# Patient Record
Sex: FEMALE | Race: OTHER | HISPANIC OR LATINO | URBAN - METROPOLITAN AREA
[De-identification: names, ages, dates, MRNs, and addresses within clinical notes are randomized per-mention and may not be internally consistent; named-entity substitution may affect disease eponyms.]

---

## 2024-09-25 ENCOUNTER — INPATIENT (INPATIENT)
Facility: HOSPITAL | Age: 34
LOS: 1 days | Discharge: HOME | DRG: 833 | End: 2024-09-27
Attending: OBSTETRICS & GYNECOLOGY | Admitting: OBSTETRICS & GYNECOLOGY
Payer: COMMERCIAL

## 2024-09-25 VITALS
RESPIRATION RATE: 16 BRPM | HEART RATE: 78 BPM | DIASTOLIC BLOOD PRESSURE: 66 MMHG | SYSTOLIC BLOOD PRESSURE: 114 MMHG | TEMPERATURE: 98 F

## 2024-09-25 DIAGNOSIS — O26.899 OTHER SPECIFIED PREGNANCY RELATED CONDITIONS, UNSPECIFIED TRIMESTER: ICD-10-CM

## 2024-09-25 LAB
BASOPHILS # BLD AUTO: 0.02 K/UL — SIGNIFICANT CHANGE UP (ref 0–0.2)
BASOPHILS NFR BLD AUTO: 0.2 % — SIGNIFICANT CHANGE UP (ref 0–2)
BLD GP AB SCN SERPL QL: SIGNIFICANT CHANGE UP
EOSINOPHIL # BLD AUTO: 0.04 K/UL — SIGNIFICANT CHANGE UP (ref 0–0.5)
EOSINOPHIL NFR BLD AUTO: 0.4 % — SIGNIFICANT CHANGE UP (ref 0–6)
HCT VFR BLD CALC: 36.3 % — SIGNIFICANT CHANGE UP (ref 34.5–45)
HGB BLD-MCNC: 11.9 G/DL — SIGNIFICANT CHANGE UP (ref 11.5–15.5)
IMM GRANULOCYTES NFR BLD AUTO: 0.5 % — SIGNIFICANT CHANGE UP (ref 0–0.9)
LYMPHOCYTES # BLD AUTO: 1.74 K/UL — SIGNIFICANT CHANGE UP (ref 1–3.3)
LYMPHOCYTES # BLD AUTO: 18.9 % — SIGNIFICANT CHANGE UP (ref 13–44)
MCHC RBC-ENTMCNC: 27.2 PG — SIGNIFICANT CHANGE UP (ref 27–34)
MCHC RBC-ENTMCNC: 32.8 GM/DL — SIGNIFICANT CHANGE UP (ref 32–36)
MCV RBC AUTO: 82.9 FL — SIGNIFICANT CHANGE UP (ref 80–100)
MONOCYTES # BLD AUTO: 0.71 K/UL — SIGNIFICANT CHANGE UP (ref 0–0.9)
MONOCYTES NFR BLD AUTO: 7.7 % — SIGNIFICANT CHANGE UP (ref 2–14)
NEUTROPHILS # BLD AUTO: 6.63 K/UL — SIGNIFICANT CHANGE UP (ref 1.8–7.4)
NEUTROPHILS NFR BLD AUTO: 72.3 % — SIGNIFICANT CHANGE UP (ref 43–77)
PLATELET # BLD AUTO: 174 K/UL — SIGNIFICANT CHANGE UP (ref 150–400)
RBC # BLD: 4.38 M/UL — SIGNIFICANT CHANGE UP (ref 3.8–5.2)
RBC # FLD: 14.1 % — SIGNIFICANT CHANGE UP (ref 10.3–14.5)
WBC # BLD: 9.19 K/UL — SIGNIFICANT CHANGE UP (ref 3.8–10.5)
WBC # FLD AUTO: 9.19 K/UL — SIGNIFICANT CHANGE UP (ref 3.8–10.5)

## 2024-09-25 RX ORDER — SODIUM CHLORIDE 0.9 % (FLUSH) 0.9 %
3 SYRINGE (ML) INJECTION EVERY 8 HOURS
Refills: 0 | Status: DISCONTINUED | OUTPATIENT
Start: 2024-09-25 | End: 2024-09-27

## 2024-09-25 RX ORDER — OXYTOCIN/RINGER'S LACTATE 20/500ML
PLASTIC BAG, INJECTION (ML) INTRAVENOUS
Qty: 30 | Refills: 0 | Status: COMPLETED | OUTPATIENT
Start: 2024-09-25 | End: 2024-09-25

## 2024-09-25 RX ORDER — SOAP/LANOLIN
1 BAR TOPICAL EVERY 4 HOURS
Refills: 0 | Status: DISCONTINUED | OUTPATIENT
Start: 2024-09-25 | End: 2024-09-27

## 2024-09-25 RX ORDER — OXYCODONE HYDROCHLORIDE 30 MG/1
5 TABLET, FILM COATED, EXTENDED RELEASE ORAL
Refills: 0 | Status: DISCONTINUED | OUTPATIENT
Start: 2024-09-25 | End: 2024-09-27

## 2024-09-25 RX ORDER — KETOROLAC TROMETHAMINE 10 MG/1
30 TABLET, FILM COATED ORAL ONCE
Refills: 0 | Status: DISCONTINUED | OUTPATIENT
Start: 2024-09-25 | End: 2024-09-25

## 2024-09-25 RX ORDER — SODIUM CHLORIDE IRRIG SOLUTION 0.9 %
1000 SOLUTION, IRRIGATION IRRIGATION
Refills: 0 | Status: DISCONTINUED | OUTPATIENT
Start: 2024-09-25 | End: 2024-09-25

## 2024-09-25 RX ORDER — PRAMOXINE HYDROCHLORIDE 10 MG/ML
1 LOTION TOPICAL EVERY 4 HOURS
Refills: 0 | Status: DISCONTINUED | OUTPATIENT
Start: 2024-09-25 | End: 2024-09-27

## 2024-09-25 RX ORDER — SODIUM CHLORIDE IRRIG SOLUTION 0.9 %
500 SOLUTION, IRRIGATION IRRIGATION ONCE
Refills: 0 | Status: COMPLETED | OUTPATIENT
Start: 2024-09-25 | End: 2024-09-26

## 2024-09-25 RX ORDER — OXYTOCIN/RINGER'S LACTATE 20/500ML
666 PLASTIC BAG, INJECTION (ML) INTRAVENOUS
Qty: 30 | Refills: 0 | Status: DISCONTINUED | OUTPATIENT
Start: 2024-09-25 | End: 2024-09-27

## 2024-09-25 RX ORDER — PRENATAL VIT,CAL 76/IRON/FOLIC 29 MG-1 MG
1 TABLET ORAL DAILY
Refills: 0 | Status: DISCONTINUED | OUTPATIENT
Start: 2024-09-25 | End: 2024-09-27

## 2024-09-25 RX ORDER — CHLORHEXIDINE GLUCONATE ORAL RINSE 1.2 MG/ML
1 SOLUTION DENTAL DAILY
Refills: 0 | Status: DISCONTINUED | OUTPATIENT
Start: 2024-09-25 | End: 2024-09-25

## 2024-09-25 RX ORDER — MAGNESIUM HYDROXIDE 400 MG/5ML
30 SUSPENSION, ORAL (FINAL DOSE FORM) ORAL
Refills: 0 | Status: DISCONTINUED | OUTPATIENT
Start: 2024-09-25 | End: 2024-09-27

## 2024-09-25 RX ORDER — DIPHENHYDRAMINE HCL 12.5MG/5ML
25 LIQUID (ML) ORAL EVERY 6 HOURS
Refills: 0 | Status: DISCONTINUED | OUTPATIENT
Start: 2024-09-25 | End: 2024-09-27

## 2024-09-25 RX ORDER — TETANUS TOXOID, REDUCED DIPHTHERIA TOXOID AND ACELLULAR PERTUSSIS VACCINE, ADSORBED 5; 2.5; 8; 8; 2.5 [IU]/.5ML; [IU]/.5ML; UG/.5ML; UG/.5ML; UG/.5ML
0.5 SUSPENSION INTRAMUSCULAR ONCE
Refills: 0 | Status: DISCONTINUED | OUTPATIENT
Start: 2024-09-25 | End: 2024-09-27

## 2024-09-25 RX ORDER — OXYTOCIN/RINGER'S LACTATE 20/500ML
2 PLASTIC BAG, INJECTION (ML) INTRAVENOUS
Qty: 30 | Refills: 0 | Status: DISCONTINUED | OUTPATIENT
Start: 2024-09-25 | End: 2024-09-27

## 2024-09-25 RX ORDER — ANTI-ITCH CREAM 1 G/100G
1 OINTMENT TOPICAL EVERY 6 HOURS
Refills: 0 | Status: DISCONTINUED | OUTPATIENT
Start: 2024-09-25 | End: 2024-09-27

## 2024-09-25 RX ORDER — DIBUCAINE 1 %
1 OINTMENT (GRAM) TOPICAL EVERY 6 HOURS
Refills: 0 | Status: DISCONTINUED | OUTPATIENT
Start: 2024-09-25 | End: 2024-09-27

## 2024-09-25 RX ORDER — SODIUM CITRATE AND CITRIC ACID MONOHYDRATE 334; 500 MG/5ML; MG/5ML
30 SOLUTION ORAL ONCE
Refills: 0 | Status: DISCONTINUED | OUTPATIENT
Start: 2024-09-25 | End: 2024-09-25

## 2024-09-25 RX ORDER — OXYCODONE HYDROCHLORIDE 30 MG/1
5 TABLET, FILM COATED, EXTENDED RELEASE ORAL ONCE
Refills: 0 | Status: DISCONTINUED | OUTPATIENT
Start: 2024-09-25 | End: 2024-09-27

## 2024-09-25 RX ORDER — ACETAMINOPHEN 325 MG
975 TABLET ORAL
Refills: 0 | Status: DISCONTINUED | OUTPATIENT
Start: 2024-09-25 | End: 2024-09-27

## 2024-09-25 RX ADMIN — Medication 167 MILLIUNIT(S)/MIN: at 20:00

## 2024-09-25 RX ADMIN — KETOROLAC TROMETHAMINE 30 MILLIGRAM(S): 10 TABLET, FILM COATED ORAL at 20:39

## 2024-09-25 RX ADMIN — Medication 125 MILLILITER(S): at 12:40

## 2024-09-25 RX ADMIN — Medication 2 MILLIUNIT(S)/MIN: at 12:40

## 2024-09-25 RX ADMIN — Medication 975 MILLIGRAM(S): at 22:57

## 2024-09-25 RX ADMIN — KETOROLAC TROMETHAMINE 30 MILLIGRAM(S): 10 TABLET, FILM COATED ORAL at 21:00

## 2024-09-25 NOTE — OB PROVIDER H&P - ATTENDING COMMENTS
Epidural patient presents with advanced cervical dilation - 5cm  will admit for augmentation of labor  patient desires sterilization, reviewed provedure risks/benefits and timing, consent in chart  FHRT category I

## 2024-09-25 NOTE — OB PROVIDER H&P - ASSESSMENT
A/p: 33yo  at 38w6d who presents to L&D for rule out ROM. Admitted for augmentation in the setting of advanced dilatation     -Admit to L&D  -Consent  -Admission labs  -IV fluids  -Fetus: Cat I tracing. Continuous toco and fetal monitoring.   -GBS: Negative, no GBS ppx required   -Analgesia: prn  -IOL plan: pitocin  -PPBC: desires BTL    Discussed with Dr. Kahn

## 2024-09-25 NOTE — OB RN PATIENT PROFILE - NS_MODEOFARRIVAL_OBGYN_ALL_OB
[Abdominal Pain] : abdominal pain [Vomiting] : vomiting [Heartburn] : heartburn [Negative] : Neurological [Nausea] : no nausea [Diarrhea] : no diarrhea [Melena] : no melena Car

## 2024-09-25 NOTE — OB PROVIDER LABOR PROGRESS NOTE - NS_SUBJECTIVE/OBJECTIVE_OBGYN_ALL_OB_FT
Pt feeling well, seen at bedside. Discussed getting epidural.   Pt with SROM at 1422.
Reexamined for variable decelerations

## 2024-09-25 NOTE — OB RN DELIVERY SUMMARY - NSSELHIDDEN_OBGYN_ALL_OB_FT
[NS_DeliveryAttending1_OBGYN_ALL_OB_FT:[id number]==],[NS_DeliveryRN_OBGYN_ALL_OB_FT:[id number]=]

## 2024-09-25 NOTE — OB PROVIDER DELIVERY SUMMARY - NSPROVIDERDELIVERYNOTE_OBGYN_ALL_OB_FT
Procedure: Normal spontaneous vaginal delivery   Findings: Viable female infant delivered in cephalic presentation at , placenta delivered at .  Apgar scores 9/9.   Weight will be recorded after 1 hour to allow for skin-to-skin contact.  Laceration(s): 2nd degree perineal  Repair: 2,0 Vicryl   EBL: 102ml  Complications: x1 loose nuchal cord    Procedure:   Patient felt rectal pressure and was found to be fully dilated, +0 station. She pushed effectively for 15 minutes. She delivered a viable female infant. A loose nuchal cord X 1 was reduced on delivery of the shoulders and delayed cord clamping was performed for 60 seconds. Placenta delivered intact and Pitocin was started at the delivery of placenta. Perineum and vagina were inspected, second degree laceration present and repaired with 2,0 Vicryl. Adequate hemostasis was obtained. Procedure: Normal spontaneous vaginal delivery   Findings: Viable female infant delivered in cephalic presentation at , placenta delivered at .  Apgar scores 9/9.   Weight will be recorded after 1 hour to allow for skin-to-skin contact.  Laceration(s): 2nd degree perineal  Repair: 2.0 Vicryl   EBL: 102ml  Complications: x1 loose nuchal cord    Procedure:   Patient felt rectal pressure and was found to be fully dilated, +0 station. She pushed effectively for 15 minutes. She delivered a viable female infant. A loose nuchal cord X 1 was reduced on delivery of the shoulders and delayed cord clamping was performed for 60 seconds. Placenta delivered intact and Pitocin was started at the delivery of placenta. Perineum and vagina were inspected, second degree laceration present and repaired with 2,0 Vicryl. Adequate hemostasis was obtained.    Attg Addendum:  I fully participated in the above delivery and agree with above delivery note.  Pt underwent an uncomplicated  as above.    2nd degree perineal laceration repaired in the usual sterile fashion.   Pt tolerated procedure well and excellent hemostasis noted at the conclusion of the procedure.  Female infant apgar 9,9 to regular nursery.    GERARDO Baker (OB hospitalist)

## 2024-09-25 NOTE — OB PROVIDER DELIVERY SUMMARY - NSSELHIDDEN_OBGYN_ALL_OB_FT
[NS_DeliveryAttending1_OBGYN_ALL_OB_FT:[id number]==],[NS_DeliveryRN_OBGYN_ALL_OB_FT:[id number]=]

## 2024-09-25 NOTE — OB RN DELIVERY SUMMARY - NS_SEPSISRSKCALC_OBGYN_ALL_OB_FT
EOS calculated successfully. EOS Risk Factor: 0.5/1000 live births ([facility] national incidence); GA=38w6d; Temp=98.24; ROM=5.5; GBS='Negative'; Antibiotics='No antibiotics or any antibiotics < 2 hrs prior to birth'

## 2024-09-25 NOTE — OB PROVIDER LABOR PROGRESS NOTE - ASSESSMENT
- continue to monitor  - continue pitocin
Plan  -Category 2 tracing, now resolved  -Continuous FHR and toco monitoring  -SVE prn

## 2024-09-25 NOTE — OB RN PATIENT PROFILE - NS_NUMBOFVISITS_OBGYN_ALL_OB_NU
RUQ abdominal pain that began 1 hour ago.  Nausea and diarrhea.  States the pain radiates to both shoulders when the pain is bad and feels like a \"stabbing\" pain  
13

## 2024-09-25 NOTE — OB PROVIDER H&P - NSHPPHYSICALEXAM_GEN_ALL_CORE
T(C): 36.7 (09-25-24 @ 10:45), Max: 36.7 (09-25-24 @ 10:45)  HR: 78 (09-25-24 @ 10:45) (78 - 78)  BP: 114/66 (09-25-24 @ 10:45) (114/66 - 114/66)  RR: 16 (09-25-24 @ 10:45) (16 - 16)    Gen: NAD, well-appearing, AAOx3   Abd: Soft, gravid  Ext: non-tender, non-edematous    SVE: 5/50/-2    Bedside sono: VTX  FHT: baseline 140, moderate variability, +accels, -decels   [place]: CTX q4min

## 2024-09-25 NOTE — OB PROVIDER LABOR PROGRESS NOTE - NS_OBIHIFHRDETAILS_OBGYN_ALL_OB_FT
baseline at 140bpm, mod kathie, + accels, - decel
130, moderate variability, +accelerations, occasional variable decelerations

## 2024-09-25 NOTE — OB PROVIDER H&P - HISTORY OF PRESENT ILLNESS
34y  at 38w6d GA who presents to L&D for rule out ROM. Patient denies vaginal bleeding, contractions. She endorses good fetal movement. Denies fevers, chills, nausea, vomiting, chest pain, SOB, dizziness and headache. No other complaints at this time.     Prenatal course uncomplicated.      POB:  (, , )  PGYN: -fibroids, -ovarian cysts, denies STD hx, denies abnormal PAPs   PMH: Denies  PSH: Denies  SH: Denies EtOH, tobacco and illicit drug use during this pregnancy; feels safe at home   Meds: PNVs  Allergies: NKDA

## 2024-09-26 LAB
HCT VFR BLD CALC: 34.5 % — SIGNIFICANT CHANGE UP (ref 34.5–45)
HGB BLD-MCNC: 11.2 G/DL — LOW (ref 11.5–15.5)
MCHC RBC-ENTMCNC: 27.2 PG — SIGNIFICANT CHANGE UP (ref 27–34)
MCHC RBC-ENTMCNC: 32.5 GM/DL — SIGNIFICANT CHANGE UP (ref 32–36)
MCV RBC AUTO: 83.7 FL — SIGNIFICANT CHANGE UP (ref 80–100)
PLATELET # BLD AUTO: 156 K/UL — SIGNIFICANT CHANGE UP (ref 150–400)
RBC # BLD: 4.12 M/UL — SIGNIFICANT CHANGE UP (ref 3.8–5.2)
RBC # FLD: 14 % — SIGNIFICANT CHANGE UP (ref 10.3–14.5)
T PALLIDUM AB TITR SER: NEGATIVE — SIGNIFICANT CHANGE UP
WBC # BLD: 12.27 K/UL — HIGH (ref 3.8–10.5)
WBC # FLD AUTO: 12.27 K/UL — HIGH (ref 3.8–10.5)

## 2024-09-26 PROCEDURE — 88302 TISSUE EXAM BY PATHOLOGIST: CPT | Mod: 26

## 2024-09-26 RX ORDER — PRENATAL VIT,CAL 76/IRON/FOLIC 29 MG-1 MG
1 TABLET ORAL
Refills: 0 | DISCHARGE

## 2024-09-26 RX ORDER — FAMOTIDINE 40 MG
20 TABLET ORAL ONCE
Refills: 0 | Status: COMPLETED | OUTPATIENT
Start: 2024-09-26 | End: 2024-09-26

## 2024-09-26 RX ORDER — ACETAMINOPHEN 325 MG
3 TABLET ORAL
Qty: 84 | Refills: 0
Start: 2024-09-26 | End: 2024-10-02

## 2024-09-26 RX ORDER — SODIUM CHLORIDE IRRIG SOLUTION 0.9 %
1000 SOLUTION, IRRIGATION IRRIGATION
Refills: 0 | Status: DISCONTINUED | OUTPATIENT
Start: 2024-09-26 | End: 2024-09-27

## 2024-09-26 RX ORDER — SODIUM CITRATE AND CITRIC ACID MONOHYDRATE 334; 500 MG/5ML; MG/5ML
30 SOLUTION ORAL ONCE
Refills: 0 | Status: COMPLETED | OUTPATIENT
Start: 2024-09-26 | End: 2024-09-26

## 2024-09-26 RX ADMIN — SODIUM CITRATE AND CITRIC ACID MONOHYDRATE 30 MILLILITER(S): 334; 500 SOLUTION ORAL at 08:38

## 2024-09-26 RX ADMIN — Medication 600 MILLIGRAM(S): at 13:20

## 2024-09-26 RX ADMIN — Medication 600 MILLIGRAM(S): at 05:42

## 2024-09-26 RX ADMIN — Medication 975 MILLIGRAM(S): at 08:38

## 2024-09-26 RX ADMIN — Medication 975 MILLIGRAM(S): at 16:20

## 2024-09-26 RX ADMIN — Medication 975 MILLIGRAM(S): at 09:06

## 2024-09-26 RX ADMIN — Medication 975 MILLIGRAM(S): at 20:26

## 2024-09-26 RX ADMIN — Medication 600 MILLIGRAM(S): at 17:48

## 2024-09-26 RX ADMIN — Medication 600 MILLIGRAM(S): at 13:11

## 2024-09-26 RX ADMIN — Medication 1 TABLET(S): at 17:47

## 2024-09-26 RX ADMIN — Medication 975 MILLIGRAM(S): at 02:55

## 2024-09-26 RX ADMIN — Medication 1000 MILLILITER(S): at 08:37

## 2024-09-26 RX ADMIN — Medication 20 MILLIGRAM(S): at 08:38

## 2024-09-26 NOTE — DISCHARGE NOTE OB - HOSPITAL COURSE
no Patient underwent a normal spontaneous vaginal delivery and bilateral salpingectomy. Postpartum course was uncomplicated. Pain is well controlled with PRN medication. She has no difficulty with ambulation, voiding, or PO intake. Lab values and vital signs are within normal limits prior to discharge.   Patient underwent a normal spontaneous vaginal delivery and postpartum bilateral salpingectomy. Postpartum course was uncomplicated. Pain is well controlled with PRN medication. She has no difficulty with ambulation, voiding, or PO intake. Lab values and vital signs are within normal limits prior to discharge.

## 2024-09-26 NOTE — OB RN INTRAOPERATIVE NOTE - NSSELHIDDEN_OBGYN_ALL_OB_FT
[NS_DeliveryAttending1_OBGYN_ALL_OB_FT:[id number]==],[NS_DeliveryRN_OBGYN_ALL_OB_FT:[id number]=]

## 2024-09-26 NOTE — DISCHARGE NOTE OB - PATIENT PORTAL LINK FT
You can access the FollowMyHealth Patient Portal offered by [facility] by registering at the following website: [email or web address]. By joining [facility]’s FollowMyHealth portal, you will also be able to view your health information using other applications (apps) compatible with our system.

## 2024-09-26 NOTE — DISCHARGE NOTE OB - CARE PLAN
1 Principal Discharge DX:	Normal spontaneous vaginal delivery  Assessment and plan of treatment:	1) Please take Ibuprofen and Tylenol as needed for pain control  2) Nothing in the vagina for 6 weeks (including no sex, no tampons, and no douching).  3) Please call your doctor for a follow up your postpartum appointment in 4-6 weeks.  4) Please continue taking vitamins postpartum.   5) Please call the office sooner if you have heavy vaginal bleeding, severe abdominal pain, or fever > 100.4F.  6) You may resume regular activity as tolerated  Secondary Diagnosis:	Status post bilateral salpingectomy

## 2024-09-26 NOTE — DISCHARGE NOTE OB - CARE PROVIDER_API CALL
Marcello Charlton  Obstetrics and Gynecology  [facility] [place] [address]  Phone: [phone]  Fax: [phone]  Follow Up Time:

## 2024-09-26 NOTE — DISCHARGE NOTE OB - KEGEL (VAGINAL TIGHTENING) EXERCISES TO PROMOTE HEALING
Addended by: SHELLY CORONADO on: 9/26/2019 01:57 PM     Modules accepted: Orders    
Statement Selected

## 2024-09-26 NOTE — OB RN INTRAOPERATIVE NOTE - NSOBSELHIDDEN_OBGYN_ALL_OB_FT
[NSOBAttendingProcedure1_OBGYN_ALL_OB_FT:[id number]],[NSRNCirculatorProcedure1_OBGYN_ALL_OB_FT:[id number]]
Ambulatory

## 2024-09-26 NOTE — DISCHARGE NOTE OB - NS MD DC FALL RISK RISK
For information on Fall & Injury Prevention, visit: [email or web address] OR  [email or web address] OR  https://www.cdc.gov/steadi/patient.html

## 2024-09-26 NOTE — BRIEF OPERATIVE NOTE - SPECIMENS
R and L fallopian tubes 5-Fu Counseling: 5-Fluorouracil Counseling:  I discussed with the patient the risks of 5-fluorouracil including but not limited to erythema, scaling, itching, weeping, crusting, and pain.

## 2024-09-27 VITALS
DIASTOLIC BLOOD PRESSURE: 66 MMHG | TEMPERATURE: 98 F | SYSTOLIC BLOOD PRESSURE: 106 MMHG | HEART RATE: 53 BPM | RESPIRATION RATE: 18 BRPM | OXYGEN SATURATION: 98 %

## 2024-09-27 PROCEDURE — 86850 RBC ANTIBODY SCREEN: CPT

## 2024-09-27 PROCEDURE — 59050 FETAL MONITOR W/REPORT: CPT

## 2024-09-27 PROCEDURE — 86780 TREPONEMA PALLIDUM: CPT

## 2024-09-27 PROCEDURE — 36415 COLL VENOUS BLD VENIPUNCTURE: CPT

## 2024-09-27 PROCEDURE — T1013: CPT

## 2024-09-27 PROCEDURE — 85025 COMPLETE CBC W/AUTO DIFF WBC: CPT

## 2024-09-27 PROCEDURE — 86900 BLOOD TYPING SEROLOGIC ABO: CPT

## 2024-09-27 PROCEDURE — 86901 BLOOD TYPING SEROLOGIC RH(D): CPT

## 2024-09-27 PROCEDURE — 85027 COMPLETE CBC AUTOMATED: CPT

## 2024-09-27 PROCEDURE — 88302 TISSUE EXAM BY PATHOLOGIST: CPT

## 2024-09-27 RX ADMIN — Medication 600 MILLIGRAM(S): at 06:14

## 2024-09-27 RX ADMIN — Medication 975 MILLIGRAM(S): at 09:28

## 2024-09-27 RX ADMIN — Medication 975 MILLIGRAM(S): at 02:24

## 2024-09-27 NOTE — PROGRESS NOTE ADULT - ASSESSMENT
A/P:  34y  now PPD# 2 s/p spontaneous vaginal delivery at 38w6d EGA 2/2 advanced cervical dilation. Uncomplicated. Now s/p BS, uncomplicated.     -Vital signs stable  -Hgb: 11.9 -> 11.2  -Voiding, tolerating PO, bowel function nml   -Advance care as tolerated   -Continue routine postpartum care and education  -Healthy female infant  -Dispo: Pt is stable, doing well and meeting all postpartum milestones. Possible discharge to home today pending attending approval.    Signed: Mary Logan MD (PGY1)
A/P:  34y  now PPD# 1 s/p spontaneous vaginal delivery at 38w6d EGA 2/2 advanced cervical dilation. Uncomplicated. She is for BS today, has been NPO since MN.    -Vital signs stable  -Hgb: 11.9 -> 11.2  -Voiding, tolerating PO, bowel function nml   -Advance care as tolerated   -Continue routine postpartum care and education  -Healthy female infant  -Dispo: Pt is stable, doing well and meeting all postpartum milestones. Possible discharge to home today after BS if remains stable and pending attending approval.    Signed: Mary Logan MD (PGY1)

## 2024-09-27 NOTE — PROGRESS NOTE ADULT - ATTENDING COMMENTS
post  day # 2  post BTL day # 1    no complaints  exam wnl  labs reviewed    cleared for dc  discussed vaccination, breastfeeding  follow up for post partum visit

## 2024-09-27 NOTE — PROGRESS NOTE ADULT - SUBJECTIVE AND OBJECTIVE BOX
INTERVAL HPI/OVERNIGHT EVENTS:  34y Female s/p CSE  on 9/25/24    Vital Signs Last 24 Hrs  T(C): 36.7 (26 Sep 2024 08:03), Max: 36.8 (25 Sep 2024 15:37)  T(F): 98 (26 Sep 2024 08:03), Max: 98.3 (26 Sep 2024 04:00)  HR: 56 (26 Sep 2024 08:03) (56 - 93)  BP: 100/56 (26 Sep 2024 08:03) (92/46 - 136/64)  BP(mean): --  RR: 18 (26 Sep 2024 08:03) (14 - 18)  SpO2: 99% (26 Sep 2024 08:03) (97% - 100%)    Parameters below as of 26 Sep 2024 08:03  Patient On (Oxygen Delivery Method): room air            Patient's overall anesthesia satisfaction: satisfied    Patient seen and doing well     No headache      No residual numbness or weakness, sensory and motor function intact      No anesthetic complications or complaints noted or reported                 
34y Female s/p bilateral salpinjrectomy under spinal anesthesia on     Vital Signs     T(C): 36.8 (27 Sep 2024 04:00), Max: 37.1 (26 Sep 2024 15:44)  T(F): 98.3 (27 Sep 2024 04:00), Max: 98.8 (26 Sep 2024 15:44)  HR: 53 (27 Sep 2024 04:00) (49 - 67)  BP: 106/66 (27 Sep 2024 04:00) (102/58 - 186/104)  BP(mean): --  RR: 18 (27 Sep 2024 04:00) (14 - 18)  SpO2: 98% (27 Sep 2024 04:00) (91% - 99%)    Parameters below as of 27 Sep 2024 04:00    Patient On (Oxygen Delivery Method): room air            Patient's overall anesthesia satisfaction: Positive    Patients pain is well controlled     No pruritis at this time    Patient seen and doing well     No headache      No residual numbness or weakness, sensory and motor function intact    No anesthetic complications or complaints noted or reported          .            
TIMOTEO JACOBO is a 34y  now PPD# 1 s/p spontaneous vaginal delivery at 38w6d EGA 2/2 advanced cervical dilation. Uncomplicated. She is for BS today, has been NPO since MN.    S:    No acute events overnight.   The patient has no complaints.  Pain controlled with current treatment regimen.   She is ambulating without difficulty and tolerating PO.   + flatus/-BM/+ voiding   She endorses appropriate lochia, which is decreasing.   She denies fevers, chills, nausea and vomiting.   She denies lightheadedness, dizziness, palpitations, chest pain and SOB.     O:    T(C): 36.8 (24 @ 04:00), Max: 36.8 (24 @ 15:37)  HR: 65 (24 @ 04:00) (56 - 93)  BP: 106/67 (24 @ 04:00) (92/46 - 136/64)  RR: 18 (24 @ 04:00) (14 - 18)  SpO2: 98% (24 @ 04:00) (97% - 100%)    Gen: NAD, AOx3  Abdomen:  Soft, non-tender, non-distended  Uterus:  Fundus firm below umbilicus  VE:  Expectant lochia  Ext:  Non-tender and non-edematous                          11.2   12.27 )-----------( 156      ( 26 Sep 2024 02:46 )             34.5   
TIMOTEO JACOBO is a 34y  now PPD# 2 s/p spontaneous vaginal delivery at 38w6d EGA 2/2 advanced cervical dilation. Uncomplicated. Now s/p BS, uncomplicated.     S:    No acute events overnight.   The patient has no complaints.  Pain controlled with current treatment regimen.   She is ambulating without difficulty and tolerating PO.   + flatus/-BM/+ voiding   She endorses appropriate lochia, which is decreasing.   She denies fevers, chills, nausea and vomiting.   She denies lightheadedness, dizziness, palpitations, chest pain and SOB.     O:    T(C): 36.8 (24 @ 04:00), Max: 36.8 (24 @ 15:37)  HR: 65 (24 @ 04:00) (56 - 93)  BP: 106/67 (24 @ 04:00) (92/46 - 136/64)  RR: 18 (24 @ 04:00) (14 - 18)  SpO2: 98% (24 @ 04:00) (97% - 100%)    Gen: NAD, AOx3  Abdomen:  Soft, non-tender, non-distended  Uterus:  Fundus firm below umbilicus  VE:  Expectant lochia  Ext:  Non-tender and non-edematous                          11.2   12.27 )-----------( 156      ( 26 Sep 2024 02:46 )             34.5

## 2024-09-30 LAB — SURGICAL PATHOLOGY STUDY: SIGNIFICANT CHANGE UP
